# Patient Record
Sex: FEMALE | Race: WHITE | NOT HISPANIC OR LATINO | Employment: FULL TIME | URBAN - METROPOLITAN AREA
[De-identification: names, ages, dates, MRNs, and addresses within clinical notes are randomized per-mention and may not be internally consistent; named-entity substitution may affect disease eponyms.]

---

## 2024-02-19 ENCOUNTER — HOSPITAL ENCOUNTER (INPATIENT)
Facility: HOSPITAL | Age: 29
LOS: 2 days | Discharge: HOME | End: 2024-02-21
Attending: OBSTETRICS & GYNECOLOGY | Admitting: OBSTETRICS & GYNECOLOGY
Payer: MEDICAID

## 2024-02-19 ENCOUNTER — HOSPITAL ENCOUNTER (EMERGENCY)
Facility: HOSPITAL | Age: 29
Discharge: HOME | End: 2024-02-19
Attending: EMERGENCY MEDICINE
Payer: MEDICAID

## 2024-02-19 VITALS
HEIGHT: 65 IN | HEART RATE: 120 BPM | RESPIRATION RATE: 18 BRPM | DIASTOLIC BLOOD PRESSURE: 128 MMHG | OXYGEN SATURATION: 97 % | SYSTOLIC BLOOD PRESSURE: 175 MMHG | BODY MASS INDEX: 31.29 KG/M2 | WEIGHT: 187.83 LBS | TEMPERATURE: 98.2 F

## 2024-02-19 DIAGNOSIS — O14.93 PRE-ECLAMPSIA IN THIRD TRIMESTER (HHS-HCC): ICD-10-CM

## 2024-02-19 DIAGNOSIS — N93.9 VAGINAL BLEEDING: ICD-10-CM

## 2024-02-19 DIAGNOSIS — Z34.93 THIRD TRIMESTER PREGNANCY (HHS-HCC): Primary | ICD-10-CM

## 2024-02-19 PROBLEM — Z34.90 TERM PREGNANCY (HHS-HCC): Status: ACTIVE | Noted: 2024-02-19

## 2024-02-19 PROBLEM — O16.3 ELEVATED BLOOD PRESSURE AFFECTING PREGNANCY IN THIRD TRIMESTER, ANTEPARTUM (HHS-HCC): Status: ACTIVE | Noted: 2024-02-19

## 2024-02-19 LAB
ABO GROUP (TYPE) IN BLOOD: NORMAL
ALBUMIN SERPL-MCNC: 2.9 G/DL (ref 3.5–5)
ALBUMIN SERPL-MCNC: 3.1 G/DL (ref 3.5–5)
ALP BLD-CCNC: 152 U/L (ref 35–125)
ALP BLD-CCNC: 190 U/L (ref 35–125)
ALT SERPL-CCNC: 8 U/L (ref 5–40)
ALT SERPL-CCNC: 9 U/L (ref 5–40)
AMPHETAMINES UR QL SCN>1000 NG/ML: POSITIVE
ANION GAP SERPL CALC-SCNC: 11 MMOL/L
ANION GAP SERPL CALC-SCNC: 11 MMOL/L
ANTIBODY SCREEN: NORMAL
APPEARANCE UR: CLEAR
AST SERPL-CCNC: 16 U/L (ref 5–40)
AST SERPL-CCNC: 18 U/L (ref 5–40)
BARBITURATES UR QL SCN>300 NG/ML: NEGATIVE
BASOPHILS # BLD AUTO: 0.02 X10*3/UL (ref 0–0.1)
BASOPHILS NFR BLD AUTO: 0.2 %
BENZODIAZ UR QL SCN>300 NG/ML: NEGATIVE
BILIRUB SERPL-MCNC: <0.2 MG/DL (ref 0.1–1.2)
BILIRUB SERPL-MCNC: <0.2 MG/DL (ref 0.1–1.2)
BILIRUB UR STRIP.AUTO-MCNC: NEGATIVE MG/DL
BUN SERPL-MCNC: 7 MG/DL (ref 8–25)
BUN SERPL-MCNC: 9 MG/DL (ref 8–25)
BZE UR QL SCN>300 NG/ML: NEGATIVE
CALCIUM SERPL-MCNC: 7.5 MG/DL (ref 8.5–10.4)
CALCIUM SERPL-MCNC: 9.4 MG/DL (ref 8.5–10.4)
CANNABINOIDS UR QL SCN>50 NG/ML: POSITIVE
CHLORIDE SERPL-SCNC: 102 MMOL/L (ref 97–107)
CHLORIDE SERPL-SCNC: 104 MMOL/L (ref 97–107)
CO2 SERPL-SCNC: 22 MMOL/L (ref 24–31)
CO2 SERPL-SCNC: 23 MMOL/L (ref 24–31)
COLOR UR: ABNORMAL
CREAT SERPL-MCNC: 0.6 MG/DL (ref 0.4–1.6)
CREAT SERPL-MCNC: 0.7 MG/DL (ref 0.4–1.6)
CREAT UR-MCNC: 63 MG/DL
EGFRCR SERPLBLD CKD-EPI 2021: >90 ML/MIN/1.73M*2
EGFRCR SERPLBLD CKD-EPI 2021: >90 ML/MIN/1.73M*2
EOSINOPHIL # BLD AUTO: 0.04 X10*3/UL (ref 0–0.7)
EOSINOPHIL NFR BLD AUTO: 0.3 %
ERYTHROCYTE [DISTWIDTH] IN BLOOD BY AUTOMATED COUNT: 13.2 % (ref 11.5–14.5)
ERYTHROCYTE [DISTWIDTH] IN BLOOD BY AUTOMATED COUNT: 13.2 % (ref 11.5–14.5)
FENTANYL+NORFENTANYL UR QL SCN: NEGATIVE
GLUCOSE SERPL-MCNC: 109 MG/DL (ref 65–99)
GLUCOSE SERPL-MCNC: 128 MG/DL (ref 65–99)
GLUCOSE UR STRIP.AUTO-MCNC: NORMAL MG/DL
HBV SURFACE AG SERPL QL IA: NONREACTIVE
HCG SERPL-ACNC: NORMAL MIU/ML
HCT VFR BLD AUTO: 36.5 % (ref 36–46)
HCT VFR BLD AUTO: 36.9 % (ref 36–46)
HCV AB SER QL: NONREACTIVE
HGB BLD-MCNC: 11.7 G/DL (ref 12–16)
HGB BLD-MCNC: 11.8 G/DL (ref 12–16)
HIV 1+2 AB+HIV1 P24 AG SERPL QL IA: NONREACTIVE
HIV 1+2 AB+HIV1P24 AG SERPLBLD IA.RAPID: NONREACTIVE
HOLD SPECIMEN: NORMAL
IMM GRANULOCYTES # BLD AUTO: 0.07 X10*3/UL (ref 0–0.7)
IMM GRANULOCYTES NFR BLD AUTO: 0.6 % (ref 0–0.9)
KETONES UR STRIP.AUTO-MCNC: NEGATIVE MG/DL
LEUKOCYTE ESTERASE UR QL STRIP.AUTO: ABNORMAL
LYMPHOCYTES # BLD AUTO: 2.21 X10*3/UL (ref 1.2–4.8)
LYMPHOCYTES NFR BLD AUTO: 18.8 %
MAGNESIUM SERPL-MCNC: 5.7 MG/DL (ref 1.6–3.1)
MCH RBC QN AUTO: 26.8 PG (ref 26–34)
MCH RBC QN AUTO: 26.8 PG (ref 26–34)
MCHC RBC AUTO-ENTMCNC: 31.7 G/DL (ref 32–36)
MCHC RBC AUTO-ENTMCNC: 32.3 G/DL (ref 32–36)
MCV RBC AUTO: 83 FL (ref 80–100)
MCV RBC AUTO: 84 FL (ref 80–100)
METHADONE UR QL SCN>300 NG/ML: NEGATIVE
MONOCYTES # BLD AUTO: 0.52 X10*3/UL (ref 0.1–1)
MONOCYTES NFR BLD AUTO: 4.4 %
MUCOUS THREADS #/AREA URNS AUTO: ABNORMAL /LPF
NEUTROPHILS # BLD AUTO: 8.88 X10*3/UL (ref 1.2–7.7)
NEUTROPHILS NFR BLD AUTO: 75.7 %
NITRITE UR QL STRIP.AUTO: NEGATIVE
NRBC BLD-RTO: 0 /100 WBCS (ref 0–0)
NRBC BLD-RTO: 0 /100 WBCS (ref 0–0)
OPIATES UR QL SCN>300 NG/ML: NEGATIVE
OXYCODONE UR QL: NEGATIVE
PCP UR QL SCN>25 NG/ML: NEGATIVE
PH UR STRIP.AUTO: 7.5 [PH]
PLATELET # BLD AUTO: 328 X10*3/UL (ref 150–450)
PLATELET # BLD AUTO: 355 X10*3/UL (ref 150–450)
POTASSIUM SERPL-SCNC: 4 MMOL/L (ref 3.4–5.1)
POTASSIUM SERPL-SCNC: 4.8 MMOL/L (ref 3.4–5.1)
PROT SERPL-MCNC: 6.1 G/DL (ref 5.9–7.9)
PROT SERPL-MCNC: 7.1 G/DL (ref 5.9–7.9)
PROT UR STRIP.AUTO-MCNC: ABNORMAL MG/DL
PROT UR-MCNC: 378 MG/DL
PROT/CREAT UR: 6 MG/MG CREAT
RBC # BLD AUTO: 4.37 X10*6/UL (ref 4–5.2)
RBC # BLD AUTO: 4.4 X10*6/UL (ref 4–5.2)
RBC # UR STRIP.AUTO: ABNORMAL /UL
RBC #/AREA URNS AUTO: ABNORMAL /HPF
REFLEX ADDED, ANEMIA PANEL: NORMAL
RH FACTOR (ANTIGEN D): NORMAL
RUBV IGG SERPL IA-ACNC: 0.6 IA
RUBV IGG SERPL QL IA: NEGATIVE
SODIUM SERPL-SCNC: 136 MMOL/L (ref 133–145)
SODIUM SERPL-SCNC: 137 MMOL/L (ref 133–145)
SP GR UR STRIP.AUTO: 1.01
SQUAMOUS #/AREA URNS AUTO: ABNORMAL /HPF
TREPONEMA PALLIDUM IGG+IGM AB [PRESENCE] IN SERUM OR PLASMA BY IMMUNOASSAY: NONREACTIVE
UROBILINOGEN UR STRIP.AUTO-MCNC: NORMAL MG/DL
WBC # BLD AUTO: 11.7 X10*3/UL (ref 4.4–11.3)
WBC # BLD AUTO: 12.7 X10*3/UL (ref 4.4–11.3)
WBC #/AREA URNS AUTO: ABNORMAL /HPF

## 2024-02-19 PROCEDURE — 99283 EMERGENCY DEPT VISIT LOW MDM: CPT

## 2024-02-19 PROCEDURE — 59050 FETAL MONITOR W/REPORT: CPT

## 2024-02-19 PROCEDURE — 88307 TISSUE EXAM BY PATHOLOGIST: CPT | Mod: TC,SUR,TRILAB,WESLAB | Performed by: OBSTETRICS & GYNECOLOGY

## 2024-02-19 PROCEDURE — 59409 OBSTETRICAL CARE: CPT | Performed by: OBSTETRICS & GYNECOLOGY

## 2024-02-19 PROCEDURE — 7100000016 HC LABOR RECOVERY PER HOUR

## 2024-02-19 PROCEDURE — 2500000002 HC RX 250 W HCPCS SELF ADMINISTERED DRUGS (ALT 637 FOR MEDICARE OP, ALT 636 FOR OP/ED): Performed by: STUDENT IN AN ORGANIZED HEALTH CARE EDUCATION/TRAINING PROGRAM

## 2024-02-19 PROCEDURE — 7210000002 HC LABOR PER HOUR

## 2024-02-19 PROCEDURE — 86901 BLOOD TYPING SEROLOGIC RH(D): CPT | Performed by: OBSTETRICS & GYNECOLOGY

## 2024-02-19 PROCEDURE — 82570 ASSAY OF URINE CREATININE: CPT | Performed by: OBSTETRICS & GYNECOLOGY

## 2024-02-19 PROCEDURE — 1220000001 HC OB SEMI-PRIVATE ROOM DAILY

## 2024-02-19 PROCEDURE — 86762 RUBELLA ANTIBODY: CPT | Performed by: OBSTETRICS & GYNECOLOGY

## 2024-02-19 PROCEDURE — 80053 COMPREHEN METABOLIC PANEL: CPT | Performed by: EMERGENCY MEDICINE

## 2024-02-19 PROCEDURE — 36415 COLL VENOUS BLD VENIPUNCTURE: CPT | Performed by: OBSTETRICS & GYNECOLOGY

## 2024-02-19 PROCEDURE — 86703 HIV-1/HIV-2 1 RESULT ANTBDY: CPT | Performed by: OBSTETRICS & GYNECOLOGY

## 2024-02-19 PROCEDURE — 87340 HEPATITIS B SURFACE AG IA: CPT | Mod: TRILAB,WESLAB | Performed by: OBSTETRICS & GYNECOLOGY

## 2024-02-19 PROCEDURE — 86803 HEPATITIS C AB TEST: CPT | Mod: TRILAB,WESLAB | Performed by: OBSTETRICS & GYNECOLOGY

## 2024-02-19 PROCEDURE — 85027 COMPLETE CBC AUTOMATED: CPT | Performed by: OBSTETRICS & GYNECOLOGY

## 2024-02-19 PROCEDURE — 80053 COMPREHEN METABOLIC PANEL: CPT | Performed by: STUDENT IN AN ORGANIZED HEALTH CARE EDUCATION/TRAINING PROGRAM

## 2024-02-19 PROCEDURE — 86317 IMMUNOASSAY INFECTIOUS AGENT: CPT | Mod: TRILAB,WESLAB | Performed by: OBSTETRICS & GYNECOLOGY

## 2024-02-19 PROCEDURE — 80324 DRUG SCREEN AMPHETAMINES 1/2: CPT | Performed by: OBSTETRICS & GYNECOLOGY

## 2024-02-19 PROCEDURE — 85025 COMPLETE CBC W/AUTO DIFF WBC: CPT | Performed by: EMERGENCY MEDICINE

## 2024-02-19 PROCEDURE — 36415 COLL VENOUS BLD VENIPUNCTURE: CPT | Performed by: EMERGENCY MEDICINE

## 2024-02-19 PROCEDURE — 51701 INSERT BLADDER CATHETER: CPT

## 2024-02-19 PROCEDURE — 87389 HIV-1 AG W/HIV-1&-2 AB AG IA: CPT | Mod: TRILAB,WESLAB | Performed by: OBSTETRICS & GYNECOLOGY

## 2024-02-19 PROCEDURE — 2500000001 HC RX 250 WO HCPCS SELF ADMINISTERED DRUGS (ALT 637 FOR MEDICARE OP): Performed by: OBSTETRICS & GYNECOLOGY

## 2024-02-19 PROCEDURE — 10907ZC DRAINAGE OF AMNIOTIC FLUID, THERAPEUTIC FROM PRODUCTS OF CONCEPTION, VIA NATURAL OR ARTIFICIAL OPENING: ICD-10-PCS | Performed by: OBSTETRICS & GYNECOLOGY

## 2024-02-19 PROCEDURE — 80307 DRUG TEST PRSMV CHEM ANLYZR: CPT | Performed by: OBSTETRICS & GYNECOLOGY

## 2024-02-19 PROCEDURE — 2500000004 HC RX 250 GENERAL PHARMACY W/ HCPCS (ALT 636 FOR OP/ED): Performed by: STUDENT IN AN ORGANIZED HEALTH CARE EDUCATION/TRAINING PROGRAM

## 2024-02-19 PROCEDURE — 83735 ASSAY OF MAGNESIUM: CPT | Performed by: STUDENT IN AN ORGANIZED HEALTH CARE EDUCATION/TRAINING PROGRAM

## 2024-02-19 PROCEDURE — 80349 CANNABINOIDS NATURAL: CPT | Performed by: OBSTETRICS & GYNECOLOGY

## 2024-02-19 PROCEDURE — 84702 CHORIONIC GONADOTROPIN TEST: CPT | Performed by: EMERGENCY MEDICINE

## 2024-02-19 PROCEDURE — 88307 TISSUE EXAM BY PATHOLOGIST: CPT | Performed by: PATHOLOGY

## 2024-02-19 PROCEDURE — 2500000004 HC RX 250 GENERAL PHARMACY W/ HCPCS (ALT 636 FOR OP/ED): Performed by: OBSTETRICS & GYNECOLOGY

## 2024-02-19 PROCEDURE — 87086 URINE CULTURE/COLONY COUNT: CPT | Mod: TRILAB,WESLAB | Performed by: EMERGENCY MEDICINE

## 2024-02-19 PROCEDURE — 86780 TREPONEMA PALLIDUM: CPT | Mod: TRILAB,WESLAB | Performed by: OBSTETRICS & GYNECOLOGY

## 2024-02-19 PROCEDURE — 81001 URINALYSIS AUTO W/SCOPE: CPT | Performed by: EMERGENCY MEDICINE

## 2024-02-19 PROCEDURE — 87800 DETECT AGNT MULT DNA DIREC: CPT | Mod: TRILAB,WESLAB | Performed by: OBSTETRICS & GYNECOLOGY

## 2024-02-19 RX ORDER — ADHESIVE BANDAGE
10 BANDAGE TOPICAL
Status: DISCONTINUED | OUTPATIENT
Start: 2024-02-19 | End: 2024-02-21 | Stop reason: HOSPADM

## 2024-02-19 RX ORDER — TRANEXAMIC ACID 100 MG/ML
1000 INJECTION, SOLUTION INTRAVENOUS ONCE AS NEEDED
Status: DISCONTINUED | OUTPATIENT
Start: 2024-02-19 | End: 2024-02-21 | Stop reason: HOSPADM

## 2024-02-19 RX ORDER — OXYTOCIN/0.9 % SODIUM CHLORIDE 30/500 ML
60 PLASTIC BAG, INJECTION (ML) INTRAVENOUS ONCE AS NEEDED
Status: COMPLETED | OUTPATIENT
Start: 2024-02-19 | End: 2024-02-19

## 2024-02-19 RX ORDER — METHYLERGONOVINE MALEATE 0.2 MG/ML
0.2 INJECTION INTRAVENOUS ONCE AS NEEDED
Status: DISCONTINUED | OUTPATIENT
Start: 2024-02-19 | End: 2024-02-21 | Stop reason: HOSPADM

## 2024-02-19 RX ORDER — LABETALOL HYDROCHLORIDE 5 MG/ML
20 INJECTION, SOLUTION INTRAVENOUS ONCE
Status: COMPLETED | OUTPATIENT
Start: 2024-02-19 | End: 2024-02-19

## 2024-02-19 RX ORDER — POLYETHYLENE GLYCOL 3350 17 G/17G
17 POWDER, FOR SOLUTION ORAL 2 TIMES DAILY PRN
Status: DISCONTINUED | OUTPATIENT
Start: 2024-02-19 | End: 2024-02-21 | Stop reason: HOSPADM

## 2024-02-19 RX ORDER — SODIUM CHLORIDE, SODIUM LACTATE, POTASSIUM CHLORIDE, CALCIUM CHLORIDE 600; 310; 30; 20 MG/100ML; MG/100ML; MG/100ML; MG/100ML
125 INJECTION, SOLUTION INTRAVENOUS CONTINUOUS
Status: DISCONTINUED | OUTPATIENT
Start: 2024-02-19 | End: 2024-02-21 | Stop reason: HOSPADM

## 2024-02-19 RX ORDER — SIMETHICONE 80 MG
80 TABLET,CHEWABLE ORAL 4 TIMES DAILY PRN
Status: DISCONTINUED | OUTPATIENT
Start: 2024-02-19 | End: 2024-02-21 | Stop reason: HOSPADM

## 2024-02-19 RX ORDER — LOPERAMIDE HYDROCHLORIDE 2 MG/1
4 CAPSULE ORAL EVERY 2 HOUR PRN
Status: DISCONTINUED | OUTPATIENT
Start: 2024-02-19 | End: 2024-02-21 | Stop reason: HOSPADM

## 2024-02-19 RX ORDER — METHYLERGONOVINE MALEATE 0.2 MG/ML
0.2 INJECTION INTRAVENOUS ONCE AS NEEDED
Status: DISCONTINUED | OUTPATIENT
Start: 2024-02-19 | End: 2024-02-19

## 2024-02-19 RX ORDER — MISOPROSTOL 200 UG/1
800 TABLET ORAL ONCE AS NEEDED
Status: DISCONTINUED | OUTPATIENT
Start: 2024-02-19 | End: 2024-02-21 | Stop reason: HOSPADM

## 2024-02-19 RX ORDER — LABETALOL HYDROCHLORIDE 5 MG/ML
20 INJECTION, SOLUTION INTRAVENOUS ONCE AS NEEDED
Status: COMPLETED | OUTPATIENT
Start: 2024-02-19 | End: 2024-02-19

## 2024-02-19 RX ORDER — CARBOPROST TROMETHAMINE 250 UG/ML
250 INJECTION, SOLUTION INTRAMUSCULAR ONCE AS NEEDED
Status: DISCONTINUED | OUTPATIENT
Start: 2024-02-19 | End: 2024-02-21 | Stop reason: HOSPADM

## 2024-02-19 RX ORDER — LOPERAMIDE HYDROCHLORIDE 2 MG/1
4 CAPSULE ORAL EVERY 2 HOUR PRN
Status: DISCONTINUED | OUTPATIENT
Start: 2024-02-19 | End: 2024-02-19

## 2024-02-19 RX ORDER — TRANEXAMIC ACID 100 MG/ML
1000 INJECTION, SOLUTION INTRAVENOUS ONCE AS NEEDED
Status: DISCONTINUED | OUTPATIENT
Start: 2024-02-19 | End: 2024-02-19

## 2024-02-19 RX ORDER — MAGNESIUM SULFATE HEPTAHYDRATE 40 MG/ML
2 INJECTION, SOLUTION INTRAVENOUS CONTINUOUS
Status: DISCONTINUED | OUTPATIENT
Start: 2024-02-19 | End: 2024-02-21 | Stop reason: HOSPADM

## 2024-02-19 RX ORDER — OXYTOCIN/0.9 % SODIUM CHLORIDE 30/500 ML
60 PLASTIC BAG, INJECTION (ML) INTRAVENOUS ONCE AS NEEDED
Status: DISCONTINUED | OUTPATIENT
Start: 2024-02-19 | End: 2024-02-21 | Stop reason: HOSPADM

## 2024-02-19 RX ORDER — DIPHENHYDRAMINE HCL 25 MG
25 TABLET ORAL EVERY 6 HOURS PRN
Status: DISCONTINUED | OUTPATIENT
Start: 2024-02-19 | End: 2024-02-21 | Stop reason: HOSPADM

## 2024-02-19 RX ORDER — ONDANSETRON HYDROCHLORIDE 2 MG/ML
4 INJECTION, SOLUTION INTRAVENOUS EVERY 6 HOURS PRN
Status: DISCONTINUED | OUTPATIENT
Start: 2024-02-19 | End: 2024-02-19

## 2024-02-19 RX ORDER — CARBOPROST TROMETHAMINE 250 UG/ML
250 INJECTION, SOLUTION INTRAMUSCULAR ONCE AS NEEDED
Status: DISCONTINUED | OUTPATIENT
Start: 2024-02-19 | End: 2024-02-19

## 2024-02-19 RX ORDER — SODIUM CHLORIDE, SODIUM LACTATE, POTASSIUM CHLORIDE, CALCIUM CHLORIDE 600; 310; 30; 20 MG/100ML; MG/100ML; MG/100ML; MG/100ML
125 INJECTION, SOLUTION INTRAVENOUS CONTINUOUS
Status: DISCONTINUED | OUTPATIENT
Start: 2024-02-19 | End: 2024-02-19

## 2024-02-19 RX ORDER — NIFEDIPINE 30 MG/1
30 TABLET, FILM COATED, EXTENDED RELEASE ORAL
Status: DISCONTINUED | OUTPATIENT
Start: 2024-02-19 | End: 2024-02-21 | Stop reason: HOSPADM

## 2024-02-19 RX ORDER — IBUPROFEN 600 MG/1
600 TABLET ORAL EVERY 6 HOURS
Status: DISCONTINUED | OUTPATIENT
Start: 2024-02-19 | End: 2024-02-21 | Stop reason: HOSPADM

## 2024-02-19 RX ORDER — TERBUTALINE SULFATE 1 MG/ML
0.25 INJECTION SUBCUTANEOUS ONCE AS NEEDED
Status: DISCONTINUED | OUTPATIENT
Start: 2024-02-19 | End: 2024-02-19

## 2024-02-19 RX ORDER — HYDRALAZINE HYDROCHLORIDE 20 MG/ML
5 INJECTION INTRAMUSCULAR; INTRAVENOUS ONCE AS NEEDED
Status: DISCONTINUED | OUTPATIENT
Start: 2024-02-19 | End: 2024-02-21 | Stop reason: HOSPADM

## 2024-02-19 RX ORDER — LIDOCAINE 560 MG/1
1 PATCH PERCUTANEOUS; TOPICAL; TRANSDERMAL
Status: DISCONTINUED | OUTPATIENT
Start: 2024-02-19 | End: 2024-02-21 | Stop reason: HOSPADM

## 2024-02-19 RX ORDER — DIPHENHYDRAMINE HYDROCHLORIDE 50 MG/ML
25 INJECTION INTRAMUSCULAR; INTRAVENOUS EVERY 6 HOURS PRN
Status: DISCONTINUED | OUTPATIENT
Start: 2024-02-19 | End: 2024-02-21 | Stop reason: HOSPADM

## 2024-02-19 RX ORDER — OXYTOCIN 10 [USP'U]/ML
10 INJECTION, SOLUTION INTRAMUSCULAR; INTRAVENOUS ONCE AS NEEDED
Status: DISCONTINUED | OUTPATIENT
Start: 2024-02-19 | End: 2024-02-21 | Stop reason: HOSPADM

## 2024-02-19 RX ORDER — NIFEDIPINE 10 MG/1
10 CAPSULE ORAL ONCE AS NEEDED
Status: DISCONTINUED | OUTPATIENT
Start: 2024-02-19 | End: 2024-02-19

## 2024-02-19 RX ORDER — LABETALOL HYDROCHLORIDE 5 MG/ML
20 INJECTION, SOLUTION INTRAVENOUS ONCE AS NEEDED
Status: DISCONTINUED | OUTPATIENT
Start: 2024-02-19 | End: 2024-02-21 | Stop reason: HOSPADM

## 2024-02-19 RX ORDER — LIDOCAINE HYDROCHLORIDE 10 MG/ML
30 INJECTION INFILTRATION; PERINEURAL ONCE AS NEEDED
Status: DISCONTINUED | OUTPATIENT
Start: 2024-02-19 | End: 2024-02-19

## 2024-02-19 RX ORDER — ONDANSETRON 4 MG/1
4 TABLET, FILM COATED ORAL EVERY 6 HOURS PRN
Status: DISCONTINUED | OUTPATIENT
Start: 2024-02-19 | End: 2024-02-19

## 2024-02-19 RX ORDER — KETOROLAC TROMETHAMINE 30 MG/ML
30 INJECTION, SOLUTION INTRAMUSCULAR; INTRAVENOUS EVERY 6 HOURS PRN
Status: DISCONTINUED | OUTPATIENT
Start: 2024-02-19 | End: 2024-02-19

## 2024-02-19 RX ORDER — NIFEDIPINE 10 MG/1
10 CAPSULE ORAL ONCE AS NEEDED
Status: DISCONTINUED | OUTPATIENT
Start: 2024-02-19 | End: 2024-02-21 | Stop reason: HOSPADM

## 2024-02-19 RX ORDER — MISOPROSTOL 200 UG/1
800 TABLET ORAL ONCE AS NEEDED
Status: DISCONTINUED | OUTPATIENT
Start: 2024-02-19 | End: 2024-02-19

## 2024-02-19 RX ORDER — HYDRALAZINE HYDROCHLORIDE 20 MG/ML
5 INJECTION INTRAMUSCULAR; INTRAVENOUS ONCE AS NEEDED
Status: DISCONTINUED | OUTPATIENT
Start: 2024-02-19 | End: 2024-02-19

## 2024-02-19 RX ORDER — ACETAMINOPHEN 325 MG/1
975 TABLET ORAL EVERY 6 HOURS
Status: DISCONTINUED | OUTPATIENT
Start: 2024-02-19 | End: 2024-02-21 | Stop reason: HOSPADM

## 2024-02-19 RX ORDER — OXYTOCIN 10 [USP'U]/ML
10 INJECTION, SOLUTION INTRAMUSCULAR; INTRAVENOUS ONCE AS NEEDED
Status: DISCONTINUED | OUTPATIENT
Start: 2024-02-19 | End: 2024-02-19

## 2024-02-19 RX ORDER — CALCIUM GLUCONATE 98 MG/ML
1 INJECTION, SOLUTION INTRAVENOUS ONCE AS NEEDED
Status: DISCONTINUED | OUTPATIENT
Start: 2024-02-19 | End: 2024-02-21 | Stop reason: HOSPADM

## 2024-02-19 RX ORDER — METOCLOPRAMIDE HYDROCHLORIDE 5 MG/ML
10 INJECTION INTRAMUSCULAR; INTRAVENOUS EVERY 6 HOURS PRN
Status: DISCONTINUED | OUTPATIENT
Start: 2024-02-19 | End: 2024-02-19

## 2024-02-19 RX ORDER — ONDANSETRON 4 MG/1
4 TABLET, FILM COATED ORAL EVERY 6 HOURS PRN
Status: DISCONTINUED | OUTPATIENT
Start: 2024-02-19 | End: 2024-02-21 | Stop reason: HOSPADM

## 2024-02-19 RX ORDER — BISACODYL 10 MG/1
10 SUPPOSITORY RECTAL DAILY PRN
Status: DISCONTINUED | OUTPATIENT
Start: 2024-02-19 | End: 2024-02-21 | Stop reason: HOSPADM

## 2024-02-19 RX ORDER — METOCLOPRAMIDE 10 MG/1
10 TABLET ORAL EVERY 6 HOURS PRN
Status: DISCONTINUED | OUTPATIENT
Start: 2024-02-19 | End: 2024-02-19

## 2024-02-19 RX ORDER — ONDANSETRON HYDROCHLORIDE 2 MG/ML
4 INJECTION, SOLUTION INTRAVENOUS EVERY 6 HOURS PRN
Status: DISCONTINUED | OUTPATIENT
Start: 2024-02-19 | End: 2024-02-21 | Stop reason: HOSPADM

## 2024-02-19 RX ADMIN — LABETALOL HYDROCHLORIDE 20 MG: 5 INJECTION INTRAVENOUS at 09:10

## 2024-02-19 RX ADMIN — KETOROLAC TROMETHAMINE 30 MG: 30 INJECTION, SOLUTION INTRAMUSCULAR; INTRAVENOUS at 05:32

## 2024-02-19 RX ADMIN — IBUPROFEN 600 MG: 600 TABLET, FILM COATED ORAL at 21:43

## 2024-02-19 RX ADMIN — Medication 60 MILLI-UNITS/MIN: at 05:15

## 2024-02-19 RX ADMIN — NIFEDIPINE 30 MG: 30 TABLET, EXTENDED RELEASE ORAL at 10:47

## 2024-02-19 RX ADMIN — SODIUM CHLORIDE, SODIUM LACTATE, POTASSIUM CHLORIDE, AND CALCIUM CHLORIDE 75 ML/HR: 600; 310; 30; 20 INJECTION, SOLUTION INTRAVENOUS at 21:36

## 2024-02-19 RX ADMIN — MAGNESIUM SULFATE HEPTAHYDRATE 2 G/HR: 40 INJECTION, SOLUTION INTRAVENOUS at 09:20

## 2024-02-19 RX ADMIN — MAGNESIUM SULFATE HEPTAHYDRATE 2 G/HR: 40 INJECTION, SOLUTION INTRAVENOUS at 16:23

## 2024-02-19 RX ADMIN — LABETALOL HYDROCHLORIDE 20 MG: 5 INJECTION INTRAVENOUS at 11:28

## 2024-02-19 RX ADMIN — ACETAMINOPHEN 975 MG: 325 TABLET ORAL at 21:43

## 2024-02-19 SDOH — HEALTH STABILITY: MENTAL HEALTH: HAVE YOU USED ANY PRESCRIPTION DRUGS OTHER THAN PRESCRIBED IN THE PAST 12 MONTHS?: NO

## 2024-02-19 SDOH — SOCIAL STABILITY: SOCIAL INSECURITY: VERBAL ABUSE: DENIES

## 2024-02-19 SDOH — HEALTH STABILITY: MENTAL HEALTH: HAVE YOU USED ANY SUBSTANCES (CANABIS, COCAINE, HEROIN, HALLUCINOGENS, INHALANTS, ETC.) IN THE PAST 12 MONTHS?: YES

## 2024-02-19 SDOH — HEALTH STABILITY: MENTAL HEALTH: STRENGTHS (MUST CHOOSE TWO): SUPPORT FROM ORGANIZED COMMUNITY

## 2024-02-19 SDOH — HEALTH STABILITY: MENTAL HEALTH: WERE YOU ABLE TO COMPLETE ALL THE BEHAVIORAL HEALTH SCREENINGS?: YES

## 2024-02-19 SDOH — SOCIAL STABILITY: SOCIAL INSECURITY: HAS ANYONE EVER THREATENED TO HURT YOUR FAMILY OR YOUR PETS?: NO

## 2024-02-19 SDOH — SOCIAL STABILITY: SOCIAL INSECURITY: POSSIBLE ABUSE REPORTED TO:: OTHER (COMMENT)

## 2024-02-19 SDOH — HEALTH STABILITY: MENTAL HEALTH: SUICIDAL BEHAVIOR (LIFETIME): NO

## 2024-02-19 SDOH — SOCIAL STABILITY: SOCIAL INSECURITY: PHYSICAL ABUSE: DENIES

## 2024-02-19 SDOH — SOCIAL STABILITY: SOCIAL INSECURITY: ARE THERE ANY APPARENT SIGNS OF INJURIES/BEHAVIORS THAT COULD BE RELATED TO ABUSE/NEGLECT?: NO

## 2024-02-19 SDOH — SOCIAL STABILITY: SOCIAL INSECURITY: DO YOU FEEL ANYONE HAS EXPLOITED OR TAKEN ADVANTAGE OF YOU FINANCIALLY OR OF YOUR PERSONAL PROPERTY?: NO

## 2024-02-19 SDOH — SOCIAL STABILITY: SOCIAL INSECURITY: HAVE YOU HAD THOUGHTS OF HARMING ANYONE ELSE?: NO

## 2024-02-19 SDOH — HEALTH STABILITY: MENTAL HEALTH: NON-SPECIFIC ACTIVE SUICIDAL THOUGHTS (PAST 1 MONTH): NO

## 2024-02-19 SDOH — SOCIAL STABILITY: SOCIAL INSECURITY: DOES ANYONE TRY TO KEEP YOU FROM HAVING/CONTACTING OTHER FRIENDS OR DOING THINGS OUTSIDE YOUR HOME?: NO

## 2024-02-19 SDOH — SOCIAL STABILITY: SOCIAL INSECURITY: ABUSE SCREEN: ADULT

## 2024-02-19 SDOH — SOCIAL STABILITY: SOCIAL INSECURITY: ARE YOU OR HAVE YOU BEEN THREATENED OR ABUSED PHYSICALLY, EMOTIONALLY, OR SEXUALLY BY ANYONE?: NO

## 2024-02-19 SDOH — ECONOMIC STABILITY: HOUSING INSECURITY: DO YOU FEEL UNSAFE GOING BACK TO THE PLACE WHERE YOU ARE LIVING?: NO

## 2024-02-19 SDOH — HEALTH STABILITY: MENTAL HEALTH: WISH TO BE DEAD (PAST 1 MONTH): NO

## 2024-02-19 ASSESSMENT — LIFESTYLE VARIABLES
HOW OFTEN DO YOU HAVE 6 OR MORE DRINKS ON ONE OCCASION: NEVER
HOW MANY STANDARD DRINKS CONTAINING ALCOHOL DO YOU HAVE ON A TYPICAL DAY: PATIENT DOES NOT DRINK
HOW OFTEN DO YOU HAVE 6 OR MORE DRINKS ON ONE OCCASION: NEVER
HOW OFTEN DO YOU HAVE A DRINK CONTAINING ALCOHOL: NEVER
AUDIT-C TOTAL SCORE: 0
SKIP TO QUESTIONS 9-10: 1
AUDIT-C TOTAL SCORE: 0
SKIP TO QUESTIONS 9-10: 1
HOW MANY STANDARD DRINKS CONTAINING ALCOHOL DO YOU HAVE ON A TYPICAL DAY: PATIENT DOES NOT DRINK
HOW OFTEN DO YOU HAVE A DRINK CONTAINING ALCOHOL: NEVER

## 2024-02-19 ASSESSMENT — COLUMBIA-SUICIDE SEVERITY RATING SCALE - C-SSRS
6. HAVE YOU EVER DONE ANYTHING, STARTED TO DO ANYTHING, OR PREPARED TO DO ANYTHING TO END YOUR LIFE?: NO
1. IN THE PAST MONTH, HAVE YOU WISHED YOU WERE DEAD OR WISHED YOU COULD GO TO SLEEP AND NOT WAKE UP?: NO
2. HAVE YOU ACTUALLY HAD ANY THOUGHTS OF KILLING YOURSELF?: NO

## 2024-02-19 ASSESSMENT — PAIN DESCRIPTION - PAIN TYPE: TYPE: ACUTE PAIN

## 2024-02-19 ASSESSMENT — PAIN - FUNCTIONAL ASSESSMENT
PAIN_FUNCTIONAL_ASSESSMENT: 0-10
PAIN_FUNCTIONAL_ASSESSMENT: 0-10

## 2024-02-19 ASSESSMENT — PAIN SCALES - GENERAL
PAINLEVEL_OUTOF10: 0 - NO PAIN
PAINLEVEL_OUTOF10: 5 - MODERATE PAIN
PAINLEVEL_OUTOF10: 0 - NO PAIN
PAINLEVEL_OUTOF10: 9
PAINLEVEL_OUTOF10: 2
PAINLEVEL_OUTOF10: 0 - NO PAIN
PAINLEVEL_OUTOF10: 0 - NO PAIN
PAINLEVEL_OUTOF10: 9
PAINLEVEL_OUTOF10: 7

## 2024-02-19 ASSESSMENT — PATIENT HEALTH QUESTIONNAIRE - PHQ9
1. LITTLE INTEREST OR PLEASURE IN DOING THINGS: NOT AT ALL
SUM OF ALL RESPONSES TO PHQ9 QUESTIONS 1 & 2: 0
2. FEELING DOWN, DEPRESSED OR HOPELESS: NOT AT ALL
SUM OF ALL RESPONSES TO PHQ9 QUESTIONS 1 & 2: 0
1. LITTLE INTEREST OR PLEASURE IN DOING THINGS: NOT AT ALL
2. FEELING DOWN, DEPRESSED OR HOPELESS: NOT AT ALL

## 2024-02-19 ASSESSMENT — PAIN DESCRIPTION - ORIENTATION: ORIENTATION: RIGHT;LEFT;LOWER

## 2024-02-19 ASSESSMENT — PAIN DESCRIPTION - DESCRIPTORS
DESCRIPTORS: SHARP
DESCRIPTORS: SORE
DESCRIPTORS: SHARP
DESCRIPTORS: PRESSURE;SORE

## 2024-02-19 ASSESSMENT — PAIN DESCRIPTION - ONSET: ONSET: SUDDEN

## 2024-02-19 ASSESSMENT — PAIN DESCRIPTION - PROGRESSION: CLINICAL_PROGRESSION: GRADUALLY WORSENING

## 2024-02-19 ASSESSMENT — PAIN DESCRIPTION - FREQUENCY: FREQUENCY: CONSTANT/CONTINUOUS

## 2024-02-19 ASSESSMENT — PAIN DESCRIPTION - LOCATION: LOCATION: ABDOMEN

## 2024-02-19 ASSESSMENT — ACTIVITIES OF DAILY LIVING (ADL): LACK_OF_TRANSPORTATION: PATIENT DECLINED

## 2024-02-19 NOTE — PROGRESS NOTES
Reviewing patients labs and her pro/cr was 6 and she tested positive for amphetamines.   Patient denies drug use other then marijuana.   Discussed preeclampsia and starting magnesium with patient who is agreeable. Plan magnesium for 24h and serial bp monitoring.

## 2024-02-19 NOTE — L&D DELIVERY NOTE
OB Delivery Note  2024  Nafisa Rucker  28 y.o.   Vaginal, Spontaneous        Gestational Age: Unknown  /Para:   Quantitative Blood Loss: Admission to Discharge: 225 mL (2024  4:20 AM - 2024 10:18 AM)    Vandana Rucker [[id number]]      Labor Events    Rupture date/time: 2024  Rupture type: Artificial  Fluid color: Clear  Labor type: Spontaneous Onset of Labor  Complications: None       Labor Event Times    Dilation complete date/time: 2024 043       Labor Length    2nd stage: 0h 23m  3rd stage: 0h 05m       Placenta    Placenta delivery date/time: 2024 0503  Placenta removal: Spontaneous  Placenta appearance: Intact  Placenta disposition: pathology       Cord    Vessels: 3 vessels  Complications: None  Delayed cord clamping?: No  Cord comments: cord blood unable to obtain  Stem cell collection (by provider): No       Lacerations    Episiotomy: None  Perineal laceration: None  Other lacerations?: No  Repair suture: None       Anesthesia    Method: None       Operative Delivery    Forceps attempted?: No  Vacuum extractor attempted?: No       Shoulder Dystocia    Shoulder dystocia present?: No        Delivery    Birth date/time: 2024 04:58:00  Delivery type: Vaginal, Spontaneous  Complications: None       Resuscitation    Method: Suctioning, Tactile stimulation       Apgars    Living status: Living  Apgar Component Scores:  1 min.:  5 min.:  10 min.:  15 min.:  20 min.:    Skin color:  1  1       Heart rate:  2  2       Reflex irritability:  2  2       Muscle tone:  2  2       Respiratory effort:  2  2       Total:  9  9       Apgars assigned by: SHELBY GLEZ       Delivery Providers    Delivering clinician: Guerita Clifton DO   Provider Role    Stephany Dumont, RN Delivery Nurse    Rolanda Watts RN Nursery Nurse     Resident             The patient was fully dilated. The amnioic sac was ruptured with an amnihook. The patient was in dorsal lithotomy  in third stage. Fetal head descended from 1+ to 3+ with good maternal pushing efforts. Fetal head delivered in RENETTA presentation. This was followed by shoulders and the rest of the body. The baby was placed on the mother's abdomen. Delayed cord clamping was conducted for 1 minute. Then the cord was clamped x2 and  cut. The placenta was delivered spontaneously, noted to be intact with a 3 vessel cord. Pitocin running per protocol. Fundal massage applied. Good hemostasis noted. The vagina and perineum was then evaluated and there were no lacerations. The patient and baby were stable. Sponge and needle counts were correct.      Guerita Clifton, DO

## 2024-02-19 NOTE — ED PROVIDER NOTES
HPI   Chief Complaint   Patient presents with    Abdominal Pain     Pt has abdominal pain as well as lower back pain since yesterday. Today pt started to bleed from her vagina. Pt states the pain has gotten worse.       28-year-old G0, P0 female with last menstrual period 4 weeks ago comes to the emergency department with a chief complaint of abdominal pain and vaginal bleeding.  She states that yesterday she started having mild pain in her low back that radiates bilaterally up to her pubic bone.  She states that the pain was manageable and she took ibuprofen at about 3 PM yesterday.  This evening she started having red spotting.  She states that she is regular with her periods and today her period is due, but she has never had pain like this before.  She denies any hematuria, urgency, frequency, dysuria, constipation, diarrhea, fevers, nausea, vomiting.  She is sexually active with 1 male partner and has never had an intra-abdominal surgery.      History provided by:  Patient   used: No                        No data recorded                   Patient History   No past medical history on file.  No past surgical history on file.  No family history on file.  Social History     Tobacco Use    Smoking status: Not on file    Smokeless tobacco: Not on file   Substance Use Topics    Alcohol use: Not on file    Drug use: Not on file       Physical Exam   ED Triage Vitals [02/19/24 0320]   Temperature Heart Rate Respirations BP   36.8 °C (98.2 °F) (!) 120 18 (!) 175/128      Pulse Ox Temp Source Heart Rate Source Patient Position   97 % Temporal Monitor Sitting      BP Location FiO2 (%)     Right arm --       Physical Exam  Vitals and nursing note reviewed.   Constitutional:       General: She is not in acute distress.     Appearance: She is well-developed. She is ill-appearing. She is not toxic-appearing or diaphoretic.   HENT:      Head: Normocephalic and atraumatic.   Eyes:      Conjunctiva/sclera:  Conjunctivae normal.   Cardiovascular:      Rate and Rhythm: Regular rhythm. Tachycardia present.      Heart sounds: No murmur heard.  Pulmonary:      Effort: Pulmonary effort is normal. No respiratory distress.      Breath sounds: Normal breath sounds.   Abdominal:      Palpations: There is no fluid wave, hepatomegaly or splenomegaly.      Tenderness: There is generalized abdominal tenderness. There is no guarding or rebound.   Genitourinary:     Comments: Bedside ultrasound performed showing a live intrauterine pregnancy.  The ultrasound in the emergency department currently is the old machine as the most updated ultrasound.  A visualized heart rate greater than 140 was witnessed.  Due to fetal motion it was difficult to find a femur or head circumference to date the pregnancy.  Pelvic exam was deferred as there is a labor and delivery hospital within [facility] where the patient can be examined by specialist  Musculoskeletal:         General: No swelling.      Cervical back: Neck supple.   Skin:     General: Skin is warm and dry.      Capillary Refill: Capillary refill takes less than 2 seconds.   Neurological:      General: No focal deficit present.      Mental Status: She is alert.   Psychiatric:         Mood and Affect: Mood normal.         ED Course & MDM   ED Course as of 02/19/24 0459   Mon Feb 19, 2024   0430 The patient was getting her initial evaluation and her physical exam was very concerning for third trimester pregnancy.  With nursing available in the treatment area a limited bedside ultrasound was performed confirming a likely third trimester pregnancy.  I had just paged labor and delivery through the  and was on the phone with the consultant when I was informed that nursing had called the OB charge nurse who instructed them to discharge the patient from the board and transported the patient upstairs before she was excepted by the obstetrician.  I was in the middle of informing Dr. Daley of  the patient's condition when I was informed that the patient had already left the floor, at that point the obstetrician realized that there was likely several interventions that were emergent and the call was cut short before an official acceptance  [EG]   0434 Reviewing the patient's vital signs, there is concern that she has an elevated BP in the third trimester and that she may be preeclamptic.  There is no evidence of seizure-like activity.  She has not had any prenatal care.  She had denied any history of hypertension.  There is protein in her urine.  The nurses and OB were informed of the probable diagnosis of preeclampsia [EG]   0437 As the patient had been removed from the board, I am unable to order blood pressure medications such as hydralazine and magnesium. [EG]      ED Course User Index  [EG] Traci Sutton MD         Diagnoses as of 02/19/24 0459   Third trimester pregnancy   Vaginal bleeding   Labor abnormal   Pre-eclampsia in third trimester       Medical Decision Making    HPI:  As Above  PMHx/PSHx/Meds/Allergies/SH/FH as per nursing documentation and reviewed.  Review of systems: Total of 10 systems reviewed and otherwise negative except as noted elsewhere    DDX: As described in MDM    If performed, radiology listed above interpreted by me and confirmed by the Radiologist.  Medications administered during this visit (name and route): see MAR  Social determinants of health considered for this visit: lives at home, no prenatal care, history of vaping  If performed, EKG interpreted by me and detailed above    MDM Summary/considerations:  28-year-old female presenting with a gravid uterus and evidence of an entry uterine pregnancy on bedside ultrasound.  While trying to transfer the patient she was taken off the floor by nursing with good intention as she is likely greater than 20 weeks with a potential labor as she is having contractions and there is more experience staff with deliveries on  the labor and delivery floor.  The potentially accepting physician was in the middle of requesting official ultrasound for dating to confirm that this was a viable over 24-week pregnancy that was appropriate to stay at [facility] and I was writing down the recommendation and informing the physician that ultrasound technician is not in-house overnight and will need to be called in and usually takes 30 minutes.  We were discussing the risks and benefits when my charge nurse informed me that the patient had already left the floor.  This is possibly an EMTALA violation, but without likely poor outcome for the patient as she is going to a higher level of care for a pregnancy that may be in  labor without any prenatal care.    The patient was seen and triaged by our nursing/medic staff, their vitals were taken and the staff notes were reviewed.  If the patient arrived by an EMS squad or an outside agency, we discussed the case with transporting EMS medic, police, or other historians. My initial assessment was attention to their airway, breathing, and circulatory status.  We addressed any immediate or life threatening findings and completed a medical history and a physical exam if the patient or those legally responsible were in agreement with this.   **Disclaimer:  This note was dictated by speech recognition technology.  Minor errors in transcription may be present.  Please contact for clarification or corrections.      Amount and/or Complexity of Data Reviewed  Labs: ordered. Decision-making details documented in ED Course.  Radiology:  Decision-making details documented in ED Course.        Procedure  Procedures     Traci Sutton MD  [phone]

## 2024-02-19 NOTE — PROGRESS NOTES
Postpartum Progress Note    Assessment/Plan   Nafisa Rucker is a 28 y.o., , who delivered at Unknown gestation and is now postpartum day 0.    Postpartum care:  PPD 0 s/p . Preeclampsia with severe features.  Started on magnesium sulfate, and s/p IV labetalol 20 mg x 1. BP now in normal and mild ranges. PT asymptomatic. Tp/Cr 6  -Routine Postpartum care  -PRN PO pain control  -Encourage out of bed, deep breathing, PO intake  -Continue MgSO4 minimum 24 hours postpartum, monitor per protocol, vitals per protocol  -Start Nifedipine 30 XL daily first dose now  -Baby is transferred to NICU, pt declines transfer, currently indicating plans for adoption. Denies SI/HI, and patient mother was present at the bedside earlier today    Principal Problem:    Term pregnancy  Active Problems:    Elevated blood pressure affecting pregnancy in third trimester, antepartum    Pregnancy Problems (from 24 to present)       Problem Noted Resolved    Term pregnancy 2024 by Guerita Clifton, DO No    Priority:  Medium      Elevated blood pressure affecting pregnancy in third trimester, antepartum 2024 by Guerita Clifton, DO No    Priority:  Medium            Hospital course: postpartum preeclampsia/eclampsia      Subjective   Her pain is well controlled with current medications  She is passing flatus  She is not ambulating well- not attempted  She is tolerating a Adult diet Regular  She reports   Her plan for contraception is Undecided    Pt recovering well after vaginal delivery. Urinating, Not yet attempted ambulating, tolerating PO. Vaginal bleeding less than menses. Denies Headache, Chest pain, SOB, nausea, vomiting, RUQ pain, or dizziness.      Objective   Allergies:   Patient has no known allergies.         Last Vitals:  Temp Pulse Resp BP MAP Pulse Ox   36.8 °C (98.2 °F) 88 17 (!) 153/89   99 %     Vitals Min/Max Last 24 Hours:  Temp  Min: 36.6 °C (97.8 °F)  Max: 36.9 °C (98.4 °F)  Pulse  Min: 64  Max:  "137  Resp  Min: 17  Max: 20  BP  Min: 134/87  Max: 181/108    Intake/Output:     Intake/Output Summary (Last 24 hours) at 2/19/2024 1049  Last data filed at 2/19/2024 1015  Gross per 24 hour   Intake 300 ml   Output 1225 ml   Net -925 ml       Physical Exam:  General: Examination reveals a well developed, well nourished, female, in no acute distress. She is alert and cooperative.  Lungs: clear to auscultation bilaterally.  Cardiac: regular rate and rhythm, S1, S2 normal, no murmur, click, rub or gallop.  Abdomen: soft, gravid, nontender, nondistended, no abnormal masses, no epigastric pain.  Fundus: firm.  Extremities: no redness or tenderness in the calves or thighs, no edema.    Lab Data:  Lab Results   Component Value Date    WBC 12.7 (H) 02/19/2024    HGB 11.7 (L) 02/19/2024    HCT 36.9 02/19/2024     02/19/2024     No results found for: \"GLUCOSE\", \"NA\", \"K\", \"CL\", \"CO2\", \"ANIONGAP\", \"BUN\", \"CREATININE\", \"EGFR\", \"CALCIUM\", \"ALBUMIN\", \"PROT\", \"ALKPHOS\", \"ALT\", \"AST\", \"BILITOT\"  Lab Results   Component Value Date    UTPCR 6.00 (H) 02/19/2024     "

## 2024-02-19 NOTE — CARE PLAN
Problem: Postpartum  Goal: Experiences normal postpartum course  Outcome: Progressing  Goal: No s/sx infection  Outcome: Progressing  Goal: No s/sx of hemorrhage  Outcome: Progressing  Goal: Minimal s/sx of HDP and BP<160/110  Outcome: Progressing     Problem: Pain - Adult  Goal: Verbalizes/displays adequate comfort level or baseline comfort level  Outcome: Progressing     Problem: Safety - Adult  Goal: Free from fall injury  Outcome: Progressing   The patient's goals for the shift include      The clinical goals for the shift include      Over the shift, the patient did not make progress toward the following goals. Barriers to progression include unaware of pregnancy, patient in shock. Recommendations to address these barriers include patient needs time to process events of the night. Patient does plan to give baby up for adoption.     04-Nov-2018

## 2024-02-19 NOTE — PROGRESS NOTES
Magnesium bolus initiated. Dr. Clifton at bedside discussing preeclampsia with patient.      Marcelina Wiggins RN

## 2024-02-19 NOTE — NURSING NOTE
"Secure message with  Francine Patterson this morning. She states that this nurse \"does not need to do anything\" regarding contacting CPS or social work as the patient will be contacted by [facility] .   "

## 2024-02-19 NOTE — H&P
Obstetrical Admission History and Physical     Nafisa Rucker is a 28 y.o.  at Unknown. Estimated Date of Delivery: None noted.. Estimated fetal weight: 6lbs. She has had no prenatal care.    Pam Skelton presented to the ER with complaints of abdominal pain and bleeding. She believes she had a period a month ago and is wearing a tampon currently. She denies knowledge of being pregnant and has had no prenatal care. She had been on the depo shot years ago for contraception. She started having abdominal pain about 2 days ago that progressively worsened.    BSUS - FL 38 weeks        Obstetrical History   OB History    Para Term  AB Living   1             SAB IAB Ectopic Multiple Live Births                  # Outcome Date GA Lbr Everardo/2nd Weight Sex Delivery Anes PTL Lv   1 Current                Past Medical History  No past medical history on file.     Past Surgical History   No past surgical history on file.    Social History  Social History     Tobacco Use    Smoking status: Not on file    Smokeless tobacco: Not on file   Substance Use Topics    Alcohol use: Not on file     Substance and Sexual Activity   Drug Use Not on file       Allergies  Patient has no known allergies.     Medications  No medications prior to admission.       Last Vitals  Temp Pulse Resp BP MAP O2 Sat     81   (!) 158/78   100 %     Physical Examination  GENERAL: Examination reveals a well developed, well nourished and obese, gravid female in no acute distress. She is alert and cooperative.  HEENT: PERRLA. External ears normal. Nose normal, no erythema or discharge. Mouth and throat clear.  NECK: supple, no significant adenopathy  ABDOMEN: fundus soft, nontender, 34 weeks size   FHR is cat 1   [place] reading: q3  The fetus is in a vertex presentation, determined by ultrasound and Leopold's maneuver  CERVIX: complete/100/+1  EXTREMITIES: no redness or tenderness in the calves or thighs, no edema    Lab Review  No results  "found for: \"ABO\", \"LABRH\", \"ABSCRN\"  No results found for: \"WBC\", \"HGB\", \"HCT\", \"PLT\"      Assessment/Plan    Principal Problem:    Term pregnancy  Active Problems:    Elevated blood pressure affecting pregnancy in third trimester, antepartum    Labor - expectant management, GBS unknown  Check PIH labs, check PNL       Options for delivery have been discussed with the patient and she elects a vaginal delivery.  Labor has been discussed in detail. The risks, benefits, complications, alternatives, expected outcomes, potential problems during recuperation and recovery, and the risks of not performing the procedure were discussed with the patient. The patient stated understanding that the risks of delivery include, but are not limited to: death; reaction to medications; injury to bowel, bladder, ureters, uterus, cervix, vagina, and other pelvic and abdominal structures, infection; blood loss and possible need for transfusion; and potential need for surgery, including hysterectomy. The risks of injury to the infant during delivery were also discussed. All questions were answered. There was concurrence with the planned procedure, and the patient wanted to proceed.  "

## 2024-02-20 LAB
BACTERIA UR CULT: NORMAL
C TRACH RRNA SPEC QL NAA+PROBE: POSITIVE
N GONORRHOEA DNA SPEC QL PROBE+SIG AMP: NEGATIVE

## 2024-02-20 PROCEDURE — 2500000002 HC RX 250 W HCPCS SELF ADMINISTERED DRUGS (ALT 637 FOR MEDICARE OP, ALT 636 FOR OP/ED): Performed by: STUDENT IN AN ORGANIZED HEALTH CARE EDUCATION/TRAINING PROGRAM

## 2024-02-20 PROCEDURE — 2500000001 HC RX 250 WO HCPCS SELF ADMINISTERED DRUGS (ALT 637 FOR MEDICARE OP): Performed by: OBSTETRICS & GYNECOLOGY

## 2024-02-20 PROCEDURE — 1220000001 HC OB SEMI-PRIVATE ROOM DAILY

## 2024-02-20 PROCEDURE — 2500000004 HC RX 250 GENERAL PHARMACY W/ HCPCS (ALT 636 FOR OP/ED): Performed by: OBSTETRICS & GYNECOLOGY

## 2024-02-20 PROCEDURE — 99199 UNLISTED SPECIAL SVC PX/RPRT: CPT

## 2024-02-20 RX ADMIN — NIFEDIPINE 30 MG: 30 TABLET, EXTENDED RELEASE ORAL at 07:02

## 2024-02-20 RX ADMIN — IBUPROFEN 600 MG: 600 TABLET, FILM COATED ORAL at 08:32

## 2024-02-20 RX ADMIN — MAGNESIUM SULFATE HEPTAHYDRATE 2 G/HR: 40 INJECTION, SOLUTION INTRAVENOUS at 02:18

## 2024-02-20 RX ADMIN — ACETAMINOPHEN 975 MG: 325 TABLET ORAL at 03:22

## 2024-02-20 RX ADMIN — IBUPROFEN 600 MG: 600 TABLET, FILM COATED ORAL at 03:22

## 2024-02-20 RX ADMIN — ACETAMINOPHEN 975 MG: 325 TABLET ORAL at 08:31

## 2024-02-20 ASSESSMENT — PAIN SCALES - GENERAL
PAINLEVEL_OUTOF10: 0 - NO PAIN

## 2024-02-20 NOTE — PROGRESS NOTES
Postpartum Progress Note    Assessment/Plan   Nafisa Rucker is a 28 y.o., , who delivered at Unknown gestation and is now postpartum day 1.    Postpartum care:  PPD1 s/p . VSS, recovering well. Pt with preeclampsia with severe features. Will be s/p 24 hr magnesium sulfate in 1 hour, will stop infusion at that time. Vitals now in normal and mild ranges, pt asymptomatic.   -Routine Postpartum care  -PRN PO pain control  -Encourage out of bed, deep breathing, PO intake  -Vitals per protocol      Principal Problem:    Term pregnancy  Active Problems:    Elevated blood pressure affecting pregnancy in third trimester, antepartum    Pregnancy Problems (from 24 to present)       Problem Noted Resolved    Term pregnancy 2024 by Guerita Clifton, DO No    Priority:  Medium      Elevated blood pressure affecting pregnancy in third trimester, antepartum 2024 by Guerita Clifton, DO No    Priority:  Medium            Hospital course: chronic hypertension      Subjective   Her pain is well controlled with current medications  She is passing flatus  She is ambulating well  She is tolerating a Adult diet Regular  She reports   She denies emotional concerns today   Her plan for contraception is undecided     Pt recovering well after vaginal delivery- just tired. Urinating, ambulating, tolerating PO. Vaginal bleeding less than menses. Denies Headache, Chest pain, SOB, nausea, vomiting, RUQ pain, or dizziness.      Objective   Allergies:   Patient has no known allergies.         Last Vitals:  Temp Pulse Resp BP MAP Pulse Ox   36.8 °C (98.2 °F) 93 16 131/82   97 %     Vitals Min/Max Last 24 Hours:  Temp  Min: 36.6 °C (97.9 °F)  Max: 37.1 °C (98.8 °F)  Pulse  Min: 31  Max: 123  Resp  Min: 16  Max: 18  BP  Min: 116/70  Max: 167/96    Intake/Output:     Intake/Output Summary (Last 24 hours) at 2024 0810  Last data filed at 2024 0658  Gross per 24 hour   Intake 460.6 ml   Output 3900 ml   Net -3439.4 ml        Physical Exam:  General: Examination reveals a well developed, well nourished, female, in no acute distress. She is alert and cooperative.  Lungs: clear to auscultation bilaterally.  Cardiac: regular rate and rhythm, S1, S2 normal, no murmur, click, rub or gallop.  Abdomen: soft, gravid, nontender, nondistended, no abnormal masses, no epigastric pain.  Fundus: firm.  Perineum: well approximated.  Extremities: no redness or tenderness in the calves or thighs, no edema.    Lab Data:  Lab Results   Component Value Date    WBC 12.7 (H) 02/19/2024    HGB 11.7 (L) 02/19/2024    HCT 36.9 02/19/2024     02/19/2024     Lab Results   Component Value Date    GLUCOSE 128 (H) 02/19/2024     02/19/2024    K 4.0 02/19/2024     02/19/2024    CO2 23 (L) 02/19/2024    ANIONGAP 11 02/19/2024    BUN 7 (L) 02/19/2024    CREATININE 0.70 02/19/2024    EGFR >90 02/19/2024    CALCIUM 7.5 (L) 02/19/2024    ALBUMIN 2.9 (L) 02/19/2024    PROT 6.1 02/19/2024    ALKPHOS 152 (H) 02/19/2024    ALT 8 02/19/2024    AST 18 02/19/2024    BILITOT <0.2 02/19/2024     Lab Results   Component Value Date    UTPCR 6.00 (H) 02/19/2024

## 2024-02-20 NOTE — CARE PLAN
Problem: Postpartum  Goal: Experiences normal postpartum course  Outcome: Progressing  Goal: No s/sx infection  Outcome: Progressing  Goal: No s/sx of hemorrhage  Outcome: Progressing  Goal: Minimal s/sx of HDP and BP<160/110  Outcome: Progressing   The patient's goals for the shift include Pain remains under control.    The clinical goals for the shift include Patient will perform ADL's as she did before hospitalization.  Pt remains stable, remains on mag as ordered. Noted to have diminished reflexes to +1 in all extremities. Dr mahan aware, remains on mag as ordered. Denies headache, voiding per bedpan.

## 2024-02-20 NOTE — CARE PLAN
"The patient's goals for the shift include Pain remains under control.    The clinical goals for the shift include Patient will perform ADL's as she did before hospitalization.    Magnesium infusion discontinued at this time. IV to heplock. SCDs discontinued. BP stable. Pt continues to to deny headache and vision changes. Voiding QS.  Patient states, \"I am going to call the adoption agency now to set that up.\" Patient denies pain. Call light in reach    "

## 2024-02-20 NOTE — PROGRESS NOTES
"Social Work Assessment         Mother: Nafisa Rucker  Address: [address] [address]  Phone: [phone]     Referral Reason: Mother received no PNC. Mother positive for Amphetamines and Marijuana at delivery. Mother pursuing adoption     Prenatal Care: None     [place] Name: Mother is not planning to name the child   : 26     Other Children: Mother states this is her first child.      Household Composition: Mother states she resides at the motel that her mother works at. She reports she and mother share a room there     IPV/DV or Safety Concerns: Denies     Car-Seat: n/a  Safe Sleep Space: n/a   Safe Sleep Education: n/a      Transportation Concerns: Mother states she has a license but no working vehicle.      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 Mother denies amphetamine use.     Toxicology Screens: Mother tested positive for marijuana and amphetamines on 24. Baby's screens are pending     Department of Children and Family Services (DCFS): BRETT made report to [facility] due to positive drug screens on mother.         Assessment: BRETT consulted due to maternal substance abuse, no PNC and mother wanting to pursue adoption. Mother delivered a baby girl estimated to be 34 wga on 24. Mother delivered at [facility], baby was transferred to [facility] NICU. BRETT spoke with mother via phone to complete assessment and inform her of a children's service report needing to be made.     Mother is Nafisa Rucker ( 89). She did not identify a father for the child. She states the " father is not involved. Mother states this is her first child. She reports not being aware she was pregnant. She states she also never felt any movement. Mother reports she went to the ED this morning for abdominal cramping which she found out were contractions.      Mother aware she tested positive for marijuana and amphetamines. She admits to marijuana use but denies amphetamines. Mother denies use of any other illicit substances. BRETT informed mother that a report to [employer] needed to be made. Mother verbalized understanding.      Mother states she plans to go through with adoption. She states she is not being pressured to do this. BRETT explained the adoption process. BRETT faxed over a list over of adoption resources. HARVEYK encouraged mother to reach out to agencies as soon as possible if she is truly considering adoption. HARVEYK made mother aware she can change her mind if she chooses to do so. Mother states she does not plan on changing her mind. Mother is not planning to name the baby. She states her mother wants to come see the baby and she may come with her. LASHELLRK informed mother she still has all her rights as a parent.      LASHELLRK discussed PPD and encouraged mother to follow up with an OB postpartum. Mother states she does not have mental health concerns. HARVEYK also faxed over information on post partum depression.      Mother states she does not have insurance but states an individual at the OSH stopped by to assist her in getting Medicaid.      HARVEYK discussed visitor policy in the NICU. Mother states maternal grandmother of baby (Cassandra Ortega) will be the only other visitor at this time.      BRETT made report to [employer]. BRETT spoke with Yokasta Hobson and Che Rutledge.               Plan: DO NOT DISCHARGE BABY UNTIL SAFE PLAN IS IN PLACE.   Awaiting results of baby's tox screens.         KATERINA Petit  Ext [phone] Kali

## 2024-02-21 PROBLEM — O16.3 ELEVATED BLOOD PRESSURE AFFECTING PREGNANCY IN THIRD TRIMESTER, ANTEPARTUM (HHS-HCC): Status: RESOLVED | Noted: 2024-02-19 | Resolved: 2024-02-21

## 2024-02-21 PROBLEM — Z34.90 TERM PREGNANCY (HHS-HCC): Status: RESOLVED | Noted: 2024-02-19 | Resolved: 2024-02-21

## 2024-02-21 LAB — RUBV IGM SER IA-ACNC: 14.3 AU/ML

## 2024-02-21 PROCEDURE — 2500000001 HC RX 250 WO HCPCS SELF ADMINISTERED DRUGS (ALT 637 FOR MEDICARE OP): Performed by: OBSTETRICS & GYNECOLOGY

## 2024-02-21 PROCEDURE — 2500000002 HC RX 250 W HCPCS SELF ADMINISTERED DRUGS (ALT 637 FOR MEDICARE OP, ALT 636 FOR OP/ED): Performed by: STUDENT IN AN ORGANIZED HEALTH CARE EDUCATION/TRAINING PROGRAM

## 2024-02-21 RX ORDER — AZITHROMYCIN 500 MG/1
1000 TABLET, FILM COATED ORAL ONCE
Status: COMPLETED | OUTPATIENT
Start: 2024-02-21 | End: 2024-02-21

## 2024-02-21 RX ADMIN — AZITHROMYCIN DIHYDRATE 1000 MG: 500 TABLET ORAL at 10:05

## 2024-02-21 RX ADMIN — NIFEDIPINE 30 MG: 30 TABLET, EXTENDED RELEASE ORAL at 08:42

## 2024-02-21 ASSESSMENT — PAIN SCALES - GENERAL: PAINLEVEL_OUTOF10: 0 - NO PAIN

## 2024-02-21 NOTE — CARE PLAN
The patient's goals for the shift include pain management    The clinical goals for the shift include pain managment    Problem: Postpartum  Goal: Experiences normal postpartum course  Outcome: Met     Problem: Safety - Adult  Goal: Free from fall injury  Outcome: Met     Problem: Discharge Planning  Goal: Discharge to home or other facility with appropriate resources  Outcome: Met

## 2024-02-21 NOTE — CARE PLAN
The patient's goals for the shift include rest.    The clinical goals for the shift include Bps WNL & rest.    Over the shift, the patient did  make progress toward the following goals.     Encouraged pt to ambulate in the halls 1-3 times this shift. Pt verbalizes understanding.     2305- Upon rounding, RN found vape lying next to pt's pillow while she appears to be sleeping soundly. RN asked if it was a vape. Pt reports yes. RN explained hospital protocol is to not allow pt's to utilize vapes in the hospital & that it is to be confiscated & returned to her upon discharge. RN asked if there were any other vapes in the room or illegal substances. Pt denies.

## 2024-02-21 NOTE — DISCHARGE SUMMARY
Discharge Summary    Admission Date: 2/19/2024  Discharge Date: 2/21/2024    Discharge Diagnosis  Term pregnancy    Hospital Course  Delivery Date: 2/19/2024  4:58 AM   Delivery type: Vaginal, Spontaneous    GA at delivery: Unknown  Outcome: Living   Anesthesia during delivery: None   Intrapartum complications: None   Feeding method: Breastfeeding Status: Unknown         Pertinent Physical Exam At Time of Discharge  Normal exam    Discharge Meds     Your medication list      You have not been prescribed any medications.          Complications Requiring Follow-Up  You were diagnosed with chlamydia and treated with azithromycin. You will need a follow up test of cure at your 6 week check up    Test Results Pending At Discharge  Pending Labs       Order Current Status    Amphetamine Confirm, Urine In process    Rubella Antibody, IgM In process    Surgical Pathology Exam - PLACENTA In process    THC (Marijuana), Urine, Confirmation In process            Outpatient Follow-Up  No future appointments.    Jahaira Carrasquillo MD

## 2024-02-21 NOTE — PROGRESS NOTES
Postpartum Progress Note    Assessment/Plan   Nafisa Rucker is a 28 y.o., , who delivered at Unknown gestation and is now postpartum day 2 s/p . Baby at [facility] and up for adoption    Discharge home.  Pt with no prenatal care. Advised that she can call our office for postpartum visit in 6 weeks.    Principal Problem:    Term pregnancy  Active Problems:    Elevated blood pressure affecting pregnancy in third trimester, antepartum    Pregnancy Problems (from 24 to present)       Problem Noted Resolved    Term pregnancy 2024 by Guerita Clifton, DO No    Priority:  Medium      Elevated blood pressure affecting pregnancy in third trimester, antepartum 2024 by Guerita Clifton, DO No    Priority:  Medium                Subjective   Feeling well, minimal pain, light lochia    Objective   Allergies:   Patient has no known allergies.         Last Vitals:  Temp Pulse Resp BP MAP Pulse Ox   36.9 °C (98.4 °F) 77 18 119/85   99 %     Vitals Min/Max Last 24 Hours:  Temp  Min: 36.6 °C (97.9 °F)  Max: 36.9 °C (98.4 °F)  Pulse  Min: 75  Max: 95  Resp  Min: 14  Max: 18  BP  Min: 119/85  Max: 131/77    Intake/Output:   No intake or output data in the 24 hours ending 24 0923    Physical Exam:  GEN: Well appearing, Alert and oriented  HEENT: normocephalic, atraumatic  Abd: soft, NT, ND, fundus firm and nontender  Ext: No edema, nontender

## 2024-02-22 VITALS
DIASTOLIC BLOOD PRESSURE: 73 MMHG | HEART RATE: 82 BPM | RESPIRATION RATE: 18 BRPM | SYSTOLIC BLOOD PRESSURE: 127 MMHG | OXYGEN SATURATION: 97 % | BODY MASS INDEX: 31.29 KG/M2 | WEIGHT: 187.83 LBS | HEIGHT: 65 IN | TEMPERATURE: 98.4 F

## 2024-02-22 LAB
AMPHET UR-MCNC: 106 NG/ML
MDA UR-MCNC: <200 NG/ML
MDEA UR-MCNC: <200 NG/ML
MDMA UR-MCNC: <200 NG/ML
METHAMPHET UR-MCNC: 1051 NG/ML
PHENTERMINE UR CFM-MCNC: <200 NG/ML

## 2024-02-23 LAB — CARBOXYTHC UR-MCNC: 82 NG/ML

## 2024-02-26 LAB
LABORATORY COMMENT REPORT: NORMAL
PATH REPORT.FINAL DX SPEC: NORMAL
PATH REPORT.GROSS SPEC: NORMAL
PATH REPORT.RELEVANT HX SPEC: NORMAL
PATH REPORT.TOTAL CANCER: NORMAL